# Patient Record
Sex: MALE | Race: WHITE | NOT HISPANIC OR LATINO
[De-identification: names, ages, dates, MRNs, and addresses within clinical notes are randomized per-mention and may not be internally consistent; named-entity substitution may affect disease eponyms.]

---

## 2021-07-03 ENCOUNTER — TRANSCRIPTION ENCOUNTER (OUTPATIENT)
Age: 61
End: 2021-07-03

## 2021-07-03 VITALS
HEART RATE: 108 BPM | DIASTOLIC BLOOD PRESSURE: 82 MMHG | TEMPERATURE: 98 F | OXYGEN SATURATION: 95 % | SYSTOLIC BLOOD PRESSURE: 150 MMHG | RESPIRATION RATE: 148 BRPM

## 2021-07-03 LAB
ALBUMIN SERPL ELPH-MCNC: 4 G/DL — SIGNIFICANT CHANGE UP (ref 3.3–5)
ALP SERPL-CCNC: 67 U/L — SIGNIFICANT CHANGE UP (ref 40–120)
ALT FLD-CCNC: 15 U/L — SIGNIFICANT CHANGE UP (ref 10–45)
ANION GAP SERPL CALC-SCNC: 12 MMOL/L — SIGNIFICANT CHANGE UP (ref 5–17)
APTT BLD: 26.3 SEC — LOW (ref 27.5–35.5)
AST SERPL-CCNC: 17 U/L — SIGNIFICANT CHANGE UP (ref 10–40)
BASOPHILS # BLD AUTO: 0.04 K/UL — SIGNIFICANT CHANGE UP (ref 0–0.2)
BASOPHILS NFR BLD AUTO: 0.6 % — SIGNIFICANT CHANGE UP (ref 0–2)
BILIRUB SERPL-MCNC: 0.8 MG/DL — SIGNIFICANT CHANGE UP (ref 0.2–1.2)
BLD GP AB SCN SERPL QL: NEGATIVE — SIGNIFICANT CHANGE UP
BUN SERPL-MCNC: 22 MG/DL — SIGNIFICANT CHANGE UP (ref 7–23)
CALCIUM SERPL-MCNC: 8.9 MG/DL — SIGNIFICANT CHANGE UP (ref 8.4–10.5)
CHLORIDE SERPL-SCNC: 107 MMOL/L — SIGNIFICANT CHANGE UP (ref 96–108)
CO2 SERPL-SCNC: 22 MMOL/L — SIGNIFICANT CHANGE UP (ref 22–31)
CREAT SERPL-MCNC: 1.12 MG/DL — SIGNIFICANT CHANGE UP (ref 0.5–1.3)
EOSINOPHIL # BLD AUTO: 0.29 K/UL — SIGNIFICANT CHANGE UP (ref 0–0.5)
EOSINOPHIL NFR BLD AUTO: 4 % — SIGNIFICANT CHANGE UP (ref 0–6)
GLUCOSE SERPL-MCNC: 113 MG/DL — HIGH (ref 70–99)
HCT VFR BLD CALC: 44.5 % — SIGNIFICANT CHANGE UP (ref 39–50)
HGB BLD-MCNC: 15.4 G/DL — SIGNIFICANT CHANGE UP (ref 13–17)
IMM GRANULOCYTES NFR BLD AUTO: 0.4 % — SIGNIFICANT CHANGE UP (ref 0–1.5)
INR BLD: 1 — SIGNIFICANT CHANGE UP (ref 0.88–1.16)
LYMPHOCYTES # BLD AUTO: 2.18 K/UL — SIGNIFICANT CHANGE UP (ref 1–3.3)
LYMPHOCYTES # BLD AUTO: 30.1 % — SIGNIFICANT CHANGE UP (ref 13–44)
MCHC RBC-ENTMCNC: 29.7 PG — SIGNIFICANT CHANGE UP (ref 27–34)
MCHC RBC-ENTMCNC: 34.6 GM/DL — SIGNIFICANT CHANGE UP (ref 32–36)
MCV RBC AUTO: 85.7 FL — SIGNIFICANT CHANGE UP (ref 80–100)
MONOCYTES # BLD AUTO: 0.53 K/UL — SIGNIFICANT CHANGE UP (ref 0–0.9)
MONOCYTES NFR BLD AUTO: 7.3 % — SIGNIFICANT CHANGE UP (ref 2–14)
NEUTROPHILS # BLD AUTO: 4.17 K/UL — SIGNIFICANT CHANGE UP (ref 1.8–7.4)
NEUTROPHILS NFR BLD AUTO: 57.6 % — SIGNIFICANT CHANGE UP (ref 43–77)
NRBC # BLD: 0 /100 WBCS — SIGNIFICANT CHANGE UP (ref 0–0)
PLATELET # BLD AUTO: 182 K/UL — SIGNIFICANT CHANGE UP (ref 150–400)
POTASSIUM SERPL-MCNC: 3.6 MMOL/L — SIGNIFICANT CHANGE UP (ref 3.5–5.3)
POTASSIUM SERPL-SCNC: 3.6 MMOL/L — SIGNIFICANT CHANGE UP (ref 3.5–5.3)
PROT SERPL-MCNC: 6.2 G/DL — SIGNIFICANT CHANGE UP (ref 6–8.3)
PROTHROM AB SERPL-ACNC: 12 SEC — SIGNIFICANT CHANGE UP (ref 10.6–13.6)
RBC # BLD: 5.19 M/UL — SIGNIFICANT CHANGE UP (ref 4.2–5.8)
RBC # FLD: 13 % — SIGNIFICANT CHANGE UP (ref 10.3–14.5)
RH IG SCN BLD-IMP: POSITIVE — SIGNIFICANT CHANGE UP
SODIUM SERPL-SCNC: 141 MMOL/L — SIGNIFICANT CHANGE UP (ref 135–145)
WBC # BLD: 7.24 K/UL — SIGNIFICANT CHANGE UP (ref 3.8–10.5)
WBC # FLD AUTO: 7.24 K/UL — SIGNIFICANT CHANGE UP (ref 3.8–10.5)

## 2021-07-03 PROCEDURE — 99285 EMERGENCY DEPT VISIT HI MDM: CPT

## 2021-07-03 RX ORDER — MORPHINE SULFATE 50 MG/1
2 CAPSULE, EXTENDED RELEASE ORAL ONCE
Refills: 0 | Status: DISCONTINUED | OUTPATIENT
Start: 2021-07-03 | End: 2021-07-04

## 2021-07-03 NOTE — ED PROVIDER NOTE - CLINICAL SUMMARY MEDICAL DECISION MAKING FREE TEXT BOX
62 y/o M pt presents to the ED c/o priapism. Will check labs. Urology consulted. 62 y/o M pt presents to the ED c/o Peyronie's. Will check labs. Urology consulted.  Pt admitted to urology for further management.

## 2021-07-03 NOTE — ED ADULT NURSE NOTE - OBJECTIVE STATEMENT
pt came with penile pain ~ 50 mins PTA after coitus, upon PE, patient's genitalia has hematoma proximal to the penile head with mild pain. Hx of Pyrenese Dse. Denies any other med Hx and admitted to drinking a glass of wine for dinner. pt came with penile pain ~ 50 mins PTA after coitus, upon PE, patient's genitalia has hematoma proximal to the penile head with mild pain. Hx of Pyrenese Dse. on Xiaflex (3rd dose this month) Denies any other med Hx and admitted to drinking a glass of wine for dinner.

## 2021-07-03 NOTE — ED PROVIDER NOTE - OBJECTIVE STATEMENT
62 y/o M pt w/ PMHx of Peyronie's disease, receives an injection of Xiaflex once every week, presents to the ED c/o priapism today. Pt has an onset of penal erection with intense pain. Patient has a hematoma to the distal penal area. This is the first episode of this event. Pt is well appearing, secondary to critical condition. Onset of symptoms began 45 minutes prior to ED arrival,

## 2021-07-03 NOTE — ED ADULT TRIAGE NOTE - OTHER COMPLAINTS
pt here for blood collection in penis and pain x30min, started after intercourse, states he has hx of pyronese disease and get xiaflex ingestions, last performed 1 month ago, pt denies any other hx, not on blood thinners, ambulatory with steady gait, upgraded to Dr. Sánchez for immediate evaluation

## 2021-07-03 NOTE — ED PROVIDER NOTE - PHYSICAL EXAMINATION
VITAL SIGNS: I have reviewed nursing notes and confirm.  CONSTITUTIONAL: Well-developed; well-nourished; in no acute distress.   SKIN:  warm and dry, no acute rash.   HEAD:  normocephalic, atraumatic.  EYES: EOM intact; conjunctiva and sclera clear.  ENT: No nasal discharge; airway clear.   NECK: Supple; non tender.  CARD: S1, S2 normal; no murmurs, gallops, or rubs. Regular rate and rhythm.   RESP:  Clear to auscultation b/l, no wheezes, rales or rhonchi.  ABD: Normal bowel sounds; soft; non-distended; non-tender; no guarding/ rebound.  EXT: Normal ROM. No clubbing, cyanosis or edema. 2+ pulses to b/l ue/le.  Circumferential penal hematoma, tenderness to palpitation, glands of penis are nml, testicles are nml.   NEURO: Alert, oriented, grossly unremarkable  PSYCH: Cooperative, mood and affect appropriate. VITAL SIGNS: I have reviewed nursing notes and confirm.  CONSTITUTIONAL: Well-developed; well-nourished; in no acute distress.   SKIN:  warm and dry, no acute rash.   HEAD:  normocephalic, atraumatic.  EYES: EOM intact; conjunctiva and sclera clear.  ENT: No nasal discharge; airway clear.   NECK: Supple; non tender.  CARD: S1, S2 normal; no murmurs, gallops, or rubs. Regular rate and rhythm.   RESP:  Clear to auscultation b/l, no wheezes, rales or rhonchi.  ABD: Normal bowel sounds; soft; non-distended; non-tender; no guarding/ rebound.  EXT: Normal ROM. No clubbing, cyanosis or edema. 2+ pulses to b/l ue/le.  Circumferential penile hematoma, tenderness to palpitation, glands of penis wnl, testes wnl.   NEURO: Alert, oriented, grossly unremarkable  PSYCH: Cooperative, mood and affect appropriate.

## 2021-07-04 ENCOUNTER — INPATIENT (INPATIENT)
Facility: HOSPITAL | Age: 61
LOS: 0 days | Discharge: HOME | DRG: 909 | End: 2021-07-04
Attending: SURGERY | Admitting: SURGERY
Payer: COMMERCIAL

## 2021-07-04 ENCOUNTER — TRANSCRIPTION ENCOUNTER (OUTPATIENT)
Age: 61
End: 2021-07-04

## 2021-07-04 VITALS
HEART RATE: 64 BPM | SYSTOLIC BLOOD PRESSURE: 105 MMHG | DIASTOLIC BLOOD PRESSURE: 65 MMHG | RESPIRATION RATE: 17 BRPM | OXYGEN SATURATION: 96 % | TEMPERATURE: 98 F

## 2021-07-04 DIAGNOSIS — N48.6 INDURATION PENIS PLASTICA: ICD-10-CM

## 2021-07-04 LAB
COVID-19 SPIKE DOMAIN AB INTERP: POSITIVE
COVID-19 SPIKE DOMAIN ANTIBODY RESULT: 126 U/ML — HIGH
HCV AB S/CO SERPL IA: 0.04 S/CO — SIGNIFICANT CHANGE UP
HCV AB SERPL-IMP: SIGNIFICANT CHANGE UP
SARS-COV-2 IGG+IGM SERPL QL IA: 126 U/ML — HIGH
SARS-COV-2 IGG+IGM SERPL QL IA: POSITIVE
SARS-COV-2 RNA SPEC QL NAA+PROBE: NEGATIVE — SIGNIFICANT CHANGE UP

## 2021-07-04 PROCEDURE — 99285 EMERGENCY DEPT VISIT HI MDM: CPT

## 2021-07-04 PROCEDURE — 54437 REPAIR CORPOREAL TEAR: CPT | Mod: 22

## 2021-07-04 PROCEDURE — 71045 X-RAY EXAM CHEST 1 VIEW: CPT

## 2021-07-04 PROCEDURE — 85730 THROMBOPLASTIN TIME PARTIAL: CPT

## 2021-07-04 PROCEDURE — 86803 HEPATITIS C AB TEST: CPT

## 2021-07-04 PROCEDURE — 36415 COLL VENOUS BLD VENIPUNCTURE: CPT

## 2021-07-04 PROCEDURE — 86850 RBC ANTIBODY SCREEN: CPT

## 2021-07-04 PROCEDURE — 86769 SARS-COV-2 COVID-19 ANTIBODY: CPT

## 2021-07-04 PROCEDURE — 86900 BLOOD TYPING SEROLOGIC ABO: CPT

## 2021-07-04 PROCEDURE — 71045 X-RAY EXAM CHEST 1 VIEW: CPT | Mod: 26

## 2021-07-04 PROCEDURE — 86901 BLOOD TYPING SEROLOGIC RH(D): CPT

## 2021-07-04 PROCEDURE — 80053 COMPREHEN METABOLIC PANEL: CPT

## 2021-07-04 PROCEDURE — 87635 SARS-COV-2 COVID-19 AMP PRB: CPT

## 2021-07-04 PROCEDURE — 85025 COMPLETE CBC W/AUTO DIFF WBC: CPT

## 2021-07-04 PROCEDURE — 85610 PROTHROMBIN TIME: CPT

## 2021-07-04 RX ORDER — SODIUM CHLORIDE 9 MG/ML
1000 INJECTION INTRAMUSCULAR; INTRAVENOUS; SUBCUTANEOUS
Refills: 0 | Status: DISCONTINUED | OUTPATIENT
Start: 2021-07-04 | End: 2021-07-04

## 2021-07-04 RX ORDER — OXYCODONE AND ACETAMINOPHEN 5; 325 MG/1; MG/1
2 TABLET ORAL EVERY 6 HOURS
Refills: 0 | Status: DISCONTINUED | OUTPATIENT
Start: 2021-07-04 | End: 2021-07-04

## 2021-07-04 RX ORDER — HYDROMORPHONE HYDROCHLORIDE 2 MG/ML
0.5 INJECTION INTRAMUSCULAR; INTRAVENOUS; SUBCUTANEOUS ONCE
Refills: 0 | Status: DISCONTINUED | OUTPATIENT
Start: 2021-07-04 | End: 2021-07-04

## 2021-07-04 RX ORDER — HYDROMORPHONE HYDROCHLORIDE 2 MG/ML
0.5 INJECTION INTRAMUSCULAR; INTRAVENOUS; SUBCUTANEOUS
Qty: 0.5 | Refills: 0
Start: 2021-07-04

## 2021-07-04 RX ORDER — ZOLPIDEM TARTRATE 10 MG/1
5 TABLET ORAL AT BEDTIME
Refills: 0 | Status: DISCONTINUED | OUTPATIENT
Start: 2021-07-04 | End: 2021-07-04

## 2021-07-04 RX ORDER — IBUPROFEN 200 MG
600 TABLET ORAL EVERY 8 HOURS
Refills: 0 | Status: DISCONTINUED | OUTPATIENT
Start: 2021-07-04 | End: 2021-07-04

## 2021-07-04 RX ORDER — OXYCODONE AND ACETAMINOPHEN 5; 325 MG/1; MG/1
1 TABLET ORAL EVERY 4 HOURS
Refills: 0 | Status: DISCONTINUED | OUTPATIENT
Start: 2021-07-04 | End: 2021-07-04

## 2021-07-04 RX ADMIN — OXYCODONE AND ACETAMINOPHEN 1 TABLET(S): 5; 325 TABLET ORAL at 12:40

## 2021-07-04 RX ADMIN — SODIUM CHLORIDE 125 MILLILITER(S): 9 INJECTION INTRAMUSCULAR; INTRAVENOUS; SUBCUTANEOUS at 02:44

## 2021-07-04 RX ADMIN — Medication 600 MILLIGRAM(S): at 03:39

## 2021-07-04 RX ADMIN — HYDROMORPHONE HYDROCHLORIDE 0.5 MILLIGRAM(S): 2 INJECTION INTRAMUSCULAR; INTRAVENOUS; SUBCUTANEOUS at 12:40

## 2021-07-04 RX ADMIN — ZOLPIDEM TARTRATE 5 MILLIGRAM(S): 10 TABLET ORAL at 02:39

## 2021-07-04 RX ADMIN — Medication 600 MILLIGRAM(S): at 02:39

## 2021-07-04 RX ADMIN — OXYCODONE AND ACETAMINOPHEN 1 TABLET(S): 5; 325 TABLET ORAL at 11:59

## 2021-07-04 NOTE — H&P ADULT - ASSESSMENT
Patient is a 61M w/ possible penile fracture.  Patient is pain controlled, VSS, HDS, and afebrile.  Patient will be brought to OR in AM for penile exploration.

## 2021-07-04 NOTE — DISCHARGE NOTE NURSING/CASE MANAGEMENT/SOCIAL WORK - PATIENT PORTAL LINK FT
You can access the FollowMyHealth Patient Portal offered by [facility] by registering at the following website: [email or web address]. By joining [facility]’s FollowMyHealth portal, you will also be able to view your health information using other applications (apps) compatible with our system.

## 2021-07-04 NOTE — H&P ADULT - HISTORY OF PRESENT ILLNESS
Patient is a 61y old  Male who presents with a chief complaint of penile pain and swelling    HPI:    Patient is a 61M w/ pmh of Peyronie's disease receiving Xiaflex injections x 3, last one was 4 weeks ago.  Patient states that earlier this evening approx 9pm was making out and heavy rubbing of bodies with partner.  During this time patient had sudden onset sharp pain to penis and when he inspected noted black and blue and edema to penis.  Patient states this has never happened in the past.  Patient complaining of 5/10 pain now but improved from onset.  Patient has not taken any pain medication since injury, but has received morphine in ED w/ improvement to pain.  He denies n/v/f/c, dysuria, hematuria, scrotal swelling, suprapubic pain, abdominal pain.        Vital Signs Last 24 Hrs  T(C): 36.7 (03 Jul 2021 22:29), Max: 36.8 (03 Jul 2021 21:50)  T(F): 98 (03 Jul 2021 22:29), Max: 98.3 (03 Jul 2021 21:50)  HR: 75 (03 Jul 2021 22:29) (75 - 108)  BP: 124/71 (03 Jul 2021 22:29) (124/71 - 150/82)  BP(mean): --  RR: 18 (03 Jul 2021 22:29) (18 - 18)  SpO2: 98% (03 Jul 2021 22:29) (95% - 98%)  I&O's Summary      PE:  Gen: NAD, alert and oriented x 3  Abd: soft nt/nd.  : Penile shaft edematous more pronounce by meatus. Patient is circumcised. Ecchymosis noted to penis.   BRANDY: Deferred     LABS:                        15.4   7.24  )-----------( 182      ( 03 Jul 2021 22:10 )             44.5     07-03    141  |  107  |  22  ----------------------------<  113<H>  3.6   |  22  |  1.12    Ca    8.9      03 Jul 2021 22:10    TPro  6.2  /  Alb  4.0  /  TBili  0.8  /  DBili  x   /  AST  17  /  ALT  15  /  AlkPhos  67  07-03    PT/INR - ( 03 Jul 2021 22:10 )   PT: 12.0 sec;   INR: 1.00          PTT - ( 03 Jul 2021 22:10 )  PTT:26.3 sec  Cultures

## 2021-07-04 NOTE — DISCHARGE NOTE PROVIDER - CARE PROVIDER_API CALL
Fátima Seay)  Urology  [address] [address]  Phone: [phone]  Fax: [phone]  Follow Up Time:

## 2021-07-04 NOTE — PROGRESS NOTE ADULT - SUBJECTIVE AND OBJECTIVE BOX
INTERVAL HPI/OVERNIGHT EVENTS:  No acute events overnight.    VITALS:    T(F): 98.5 (07-04-21 @ 02:26), Max: 98.9 (07-04-21 @ 01:02)  HR: 61 (07-04-21 @ 02:26) (61 - 108)  BP: 122/74 (07-04-21 @ 02:26) (122/74 - 150/82)  RR: 18 (07-04-21 @ 02:26) (16 - 18)  SpO2: 96% (07-04-21 @ 02:26) (95% - 98%)  Wt(kg): --    I&O's Detail      MEDICATIONS:    ANTIBIOTICS:      PAIN CONTROL:  ibuprofen  Tablet. 600 milliGRAM(s) Oral every 8 hours PRN  oxycodone    5 mG/acetaminophen 325 mG 1 Tablet(s) Oral every 4 hours PRN  oxycodone    5 mG/acetaminophen 325 mG 2 Tablet(s) Oral every 6 hours PRN  zolpidem 5 milliGRAM(s) Oral at bedtime PRN       MEDS:      HEME/ONC        PHYSICAL EXAM:  General: No acute distress.  Alert and Oriented  Abdominal Exam: soft nt/nd.    Exam: Penis edematous, ecchymotic, and mildly tender to palpation.       LABS:                        15.4   7.24  )-----------( 182      ( 03 Jul 2021 22:10 )             44.5     07-03    141  |  107  |  22  ----------------------------<  113<H>  3.6   |  22  |  1.12    Ca    8.9      03 Jul 2021 22:10    TPro  6.2  /  Alb  4.0  /  TBili  0.8  /  DBili  x   /  AST  17  /  ALT  15  /  AlkPhos  67  07-03    PT/INR - ( 03 Jul 2021 22:10 )   PT: 12.0 sec;   INR: 1.00          PTT - ( 03 Jul 2021 22:10 )  PTT:26.3 sec      RADIOLOGY & ADDITIONAL TESTS:    ASSESSMENT: 61yMale w/ peyronie's disease s/p Xiaflex injection.  Patient is VSS, HDS, and afebrile.      PLAN:  Diet: NPO  Pain control  Monitor Urine Output  DVT ppx  OOB/IS  To OR today 7/4

## 2021-07-04 NOTE — DISCHARGE NOTE PROVIDER - HOSPITAL COURSE
61M  S/P cysto/penile fracture repair who presented early this morning with a severely edematous penis with black and blue discoloration consistent with penile trauma. He states that he had sudden-onset sharp pain to penis during an intimate episode the night prior. He has hx of peyronie's disease s/p Xiaflex injection.  Patient was taken to the OR for cysto/penile fracture repair and had an uncomplicated intraoperative and postoperative course. He is discharged in stable condition and will follow up on an outpatient basis.

## 2021-07-04 NOTE — DISCHARGE NOTE PROVIDER - NSDCMRMEDTOKEN_GEN_ALL_CORE_FT
hydrocodone-acetaminophen 5 mg-300 mg oral tablet: 1 tab(s) orally every 6 hours, As Needed -for severe pain MDD:1g/4h or 4g/day   oxycodone-acetaminophen 5 mg-325 mg oral tablet: 1 tab(s) orally every 6 hours, As Needed -for severe pain MDD:5

## 2021-07-04 NOTE — DISCHARGE NOTE NURSING/CASE MANAGEMENT/SOCIAL WORK - NSTRANSFERBELONGINGSRESP_GEN_A_NUR
yes I will START or STAY ON the medications listed below when I get home from the hospital:    predniSONE 20 mg oral tablet  -- 1 tab(s) by mouth once a day  -- Indication: For Influenza    acetaminophen 325 mg oral tablet  -- 2 tab(s) by mouth every 6 hours, As needed, Mild Pain (1 - 3)  -- Indication: For Pain    amiodarone 200 mg oral tablet  -- 1 tab(s) by mouth once a day  -- Indication: For Antiarrhytmic    rosuvastatin 10 mg oral tablet  -- 1 tab(s) by mouth once a day (at bedtime)  -- Indication: For High cholesterol    clopidogrel 75 mg oral tablet  -- 1 tab(s) by mouth once a day  -- Indication: For coronary artery disease    benzonatate 100 mg oral capsule  -- 1 cap(s) by mouth every 6 hours, As needed, Cough  -- Indication: For cough    Xanax 0.25 mg oral tablet  -- 1 tab(s) by mouth , As Needed  -- Indication: For Anxiety    Ambien 5 mg oral tablet  -- 1 tab(s) by mouth once a day (at bedtime)  -- Indication: For Insomnia    bisoprolol  -- 2.5 milligram(s) by mouth once a day  -- Indication: For tachycardia    Proventil HFA 90 mcg/inh inhalation aerosol  -- 2 puff(s) inhaled 4 times a day  -- For inhalation only.  It is very important that you take or use this exactly as directed.  Do not skip doses or discontinue unless directed by your doctor.  Obtain medical advice before taking any non-prescription drugs as some may affect the action of this medication.  Shake well before use.    -- Indication: For sortness of breath and wheeze    furosemide 20 mg oral tablet  -- 1 tab(s) by mouth once a day  -- Indication: For chronic diastolic heart failure    dextromethorphan/guaiFENesin/pseudoephedrine 10 mg-100 mg-30 mg/5 mL oral liquid  -- 10 milliliter(s) by mouth every 4 hours  -- Medication should be taken with plenty of water.    -- Indication: For cough

## 2021-07-06 PROBLEM — N48.6 INDURATION PENIS PLASTICA: Chronic | Status: ACTIVE | Noted: 2021-07-04

## 2021-07-07 ENCOUNTER — APPOINTMENT (OUTPATIENT)
Dept: UROLOGY | Facility: CLINIC | Age: 61
End: 2021-07-07
Payer: COMMERCIAL

## 2021-07-07 VITALS
BODY MASS INDEX: 24.25 KG/M2 | TEMPERATURE: 97.3 F | HEIGHT: 75 IN | HEART RATE: 76 BPM | DIASTOLIC BLOOD PRESSURE: 73 MMHG | WEIGHT: 195 LBS | SYSTOLIC BLOOD PRESSURE: 108 MMHG

## 2021-07-07 PROCEDURE — 99024 POSTOP FOLLOW-UP VISIT: CPT

## 2021-07-07 NOTE — PHYSICAL EXAM
[General Appearance - Well Developed] : well developed [General Appearance - Well Nourished] : well nourished [Normal Appearance] : normal appearance [Well Groomed] : well groomed [General Appearance - In No Acute Distress] : no acute distress [Edema] : no peripheral edema [Respiration, Rhythm And Depth] : normal respiratory rhythm and effort [Exaggerated Use Of Accessory Muscles For Inspiration] : no accessory muscle use [Abdomen Soft] : soft [Abdomen Tenderness] : non-tender [Costovertebral Angle Tenderness] : no ~M costovertebral angle tenderness [Urethral Meatus] : meatus normal [Urinary Bladder Findings] : the bladder was normal on palpation [Scrotum] : the scrotum was normal [FreeTextEntry1] : Well healing circumcision incision with sutures intact, brusing throughout penile shaft and pubic area without swelling [Normal Station and Gait] : the gait and station were normal for the patient's age [] : no rash [No Focal Deficits] : no focal deficits [Oriented To Time, Place, And Person] : oriented to person, place, and time [Affect] : the affect was normal [Mood] : the mood was normal [Not Anxious] : not anxious [No Palpable Adenopathy] : no palpable adenopathy

## 2021-07-07 NOTE — HISTORY OF PRESENT ILLNESS
[FreeTextEntry1] : 61 year old man with history of Peyronie's disease s/p xiaflex injections complicated by tunical rupture requiring penile exploration and repair of tunical rupture on 7/4/21. The injury was dorsal and required mobilization of dorsal neurovascular bundle. No complications since surgery. He has had some oozing of blood from suture line and progression of bruising without major hematoma. No fevers, chills, dysuria, hematuria, frequency or urgency.

## 2021-07-07 NOTE — ASSESSMENT
[FreeTextEntry1] : 61 year old man with peyronie's disease s/p xiaflex injection c/b penile fracture s/p repair on 7/4/21. Presents for first post-op appointment. Penis is healing appropriately with only mild oozing of blood from suture line. No concern for significant bleeding or infection at this time.\par  - Follow up in 6 weeks for post-op check
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
## 2021-07-16 DIAGNOSIS — S39.840A FRACTURE OF CORPUS CAVERNOSUM PENIS, INITIAL ENCOUNTER: ICD-10-CM

## 2021-07-16 DIAGNOSIS — N48.6 INDURATION PENIS PLASTICA: ICD-10-CM
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
## 2021-08-18 ENCOUNTER — APPOINTMENT (OUTPATIENT)
Dept: UROLOGY | Facility: CLINIC | Age: 61
End: 2021-08-18
Payer: COMMERCIAL

## 2021-08-18 VITALS
SYSTOLIC BLOOD PRESSURE: 136 MMHG | DIASTOLIC BLOOD PRESSURE: 76 MMHG | HEART RATE: 83 BPM | OXYGEN SATURATION: 98 % | TEMPERATURE: 98.4 F

## 2021-08-18 DIAGNOSIS — S39.840D FRACTURE OF CORPUS CAVERNOSUM PENIS, SUBSEQUENT ENCOUNTER: ICD-10-CM

## 2021-08-18 DIAGNOSIS — N48.6 INDURATION PENIS PLASTICA: ICD-10-CM

## 2021-08-18 PROCEDURE — 99024 POSTOP FOLLOW-UP VISIT: CPT

## 2021-08-18 NOTE — PHYSICAL EXAM
[General Appearance - Well Developed] : well developed [General Appearance - Well Nourished] : well nourished [Normal Appearance] : normal appearance [Well Groomed] : well groomed [General Appearance - In No Acute Distress] : no acute distress [Abdomen Soft] : soft [Abdomen Tenderness] : non-tender [Costovertebral Angle Tenderness] : no ~M costovertebral angle tenderness [Urethral Meatus] : meatus normal [Urinary Bladder Findings] : the bladder was normal on palpation [Scrotum] : the scrotum was normal [Edema] : no peripheral edema [] : no respiratory distress [Respiration, Rhythm And Depth] : normal respiratory rhythm and effort [Exaggerated Use Of Accessory Muscles For Inspiration] : no accessory muscle use [Oriented To Time, Place, And Person] : oriented to person, place, and time [Affect] : the affect was normal [Mood] : the mood was normal [Not Anxious] : not anxious [Normal Station and Gait] : the gait and station were normal for the patient's age [No Focal Deficits] : no focal deficits [FreeTextEntry1] : Well healed circumcision, no residual bruising, palpable sutures dorsally

## 2021-08-18 NOTE — ASSESSMENT
[FreeTextEntry1] : 61 year old man with peyronie's disease s/p xiaflex injection c/b penile fracture s/p repair on 7/4/21. Presents for 6 week post-op appointment. Circumcision has healed well. No intentional erections or sexual activity yet. Patient reassured that he could begin gentle sexual activity and progress as able. He was told that his erections may not be as strong as they were prior. He will let me know if this is the case. We discussed Peyronies disease and use of xiaflex in the future. I told him that his curvature may worsen after the fracture, but that I would not consider using xiaflex for at least one year after the fracture.  He had some foul smelling urine and dark urine after the procedure, likely due to cystoscopy, that has resolved. UA dipstick is negative.\par  - F/U as needed\par

## 2021-09-05 ENCOUNTER — TRANSCRIPTION ENCOUNTER (OUTPATIENT)
Age: 61
End: 2021-09-05

## 2023-04-05 NOTE — BRIEF OPERATIVE NOTE - NSICDXBRIEFPROCEDURE_GEN_ALL_CORE_FT
PROCEDURES:  Cystoscopy, with penile fracture repair 04-Jul-2021 12:13:42  Adair Sagastume   Patient

## 2023-10-25 NOTE — PATIENT PROFILE ADULT - TOBACCO USE
Health Maintenance Due   Topic Date Due   • Hepatitis B Vaccine (2 of 3 - 3-dose series) 2014   • IPV Vaccine (1 of 3 - 4-dose series) Never done   • COVID-19 Vaccine (1) Never done   • MMR Vaccine (1 of 2 - Standard series) Never done   • Varicella Vaccine (1 of 2 - 2-dose childhood series) Never done   • Hepatitis A Vaccine (1 of 2 - 2-dose series) Never done   • DTaP/Tdap/Td Vaccine (1 - Tdap) Never done   • Annual Physical (ages 3-18)  02/17/2023   • Influenza Vaccine (1) Never done       Patient is due for topics as listed above but is not proceeding with Immunization(s) MMR, Pneumococcal and Varicella at this time.      Never smoker

## 2025-01-10 ENCOUNTER — NON-APPOINTMENT (OUTPATIENT)
Age: 65
End: 2025-01-10

## 2025-01-10 ENCOUNTER — APPOINTMENT (OUTPATIENT)
Dept: UROLOGY | Facility: CLINIC | Age: 65
End: 2025-01-10
Payer: COMMERCIAL

## 2025-01-10 VITALS
TEMPERATURE: 97.2 F | BODY MASS INDEX: 24.25 KG/M2 | DIASTOLIC BLOOD PRESSURE: 77 MMHG | HEIGHT: 75 IN | WEIGHT: 195 LBS | HEART RATE: 88 BPM | SYSTOLIC BLOOD PRESSURE: 146 MMHG

## 2025-01-10 DIAGNOSIS — N48.6 INDURATION PENIS PLASTICA: ICD-10-CM

## 2025-01-10 DIAGNOSIS — R39.9 UNSPECIFIED SYMPTOMS AND SIGNS INVOLVING THE GENITOURINARY SYSTEM: ICD-10-CM

## 2025-01-10 PROCEDURE — 51798 US URINE CAPACITY MEASURE: CPT

## 2025-01-10 PROCEDURE — G2211 COMPLEX E/M VISIT ADD ON: CPT | Mod: NC

## 2025-01-10 PROCEDURE — 99204 OFFICE O/P NEW MOD 45 MIN: CPT

## 2025-01-10 RX ORDER — TADALAFIL 5 MG/1
5 TABLET ORAL
Qty: 90 | Refills: 3 | Status: ACTIVE | COMMUNITY
Start: 2025-01-10 | End: 1900-01-01

## 2025-01-14 LAB
APPEARANCE: CLEAR
BACTERIA UR CULT: NORMAL
BACTERIA: NEGATIVE /HPF
BILIRUBIN URINE: NEGATIVE
BLOOD URINE: NEGATIVE
CAST: 0 /LPF
COLOR: YELLOW
EPITHELIAL CELLS: 0 /HPF
GLUCOSE QUALITATIVE U: NEGATIVE MG/DL
KETONES URINE: NEGATIVE MG/DL
LEUKOCYTE ESTERASE URINE: NEGATIVE
MICROSCOPIC-UA: NORMAL
NITRITE URINE: NEGATIVE
PH URINE: 6.5
PROTEIN URINE: NEGATIVE MG/DL
PSA SERPL-MCNC: 0.8 NG/ML
RED BLOOD CELLS URINE: 0 /HPF
SPECIFIC GRAVITY URINE: 1.02
UROBILINOGEN URINE: 1 MG/DL
WHITE BLOOD CELLS URINE: 0 /HPF